# Patient Record
Sex: FEMALE | Race: BLACK OR AFRICAN AMERICAN | NOT HISPANIC OR LATINO | Employment: FULL TIME | URBAN - METROPOLITAN AREA
[De-identification: names, ages, dates, MRNs, and addresses within clinical notes are randomized per-mention and may not be internally consistent; named-entity substitution may affect disease eponyms.]

---

## 2017-01-10 PROBLEM — K50.111 CROHN'S DISEASE OF LARGE INTESTINE WITH RECTAL BLEEDING: Status: ACTIVE | Noted: 2017-01-10

## 2017-01-10 PROBLEM — Z22.7 LATENT TUBERCULOSIS BY SKIN TEST: Status: ACTIVE | Noted: 2017-01-10

## 2019-04-12 ENCOUNTER — TELEPHONE (OUTPATIENT)
Dept: PHARMACY | Facility: CLINIC | Age: 34
End: 2019-04-12

## 2019-04-15 ENCOUNTER — TELEPHONE (OUTPATIENT)
Dept: PHARMACY | Facility: CLINIC | Age: 34
End: 2019-04-15

## 2019-04-16 ENCOUNTER — TELEPHONE (OUTPATIENT)
Dept: PHARMACY | Facility: CLINIC | Age: 34
End: 2019-04-16

## 2019-04-16 NOTE — TELEPHONE ENCOUNTER
DOCUMENTATION ONLY:  Prior authorization for Humira 40mg Pen approved from 04/16/2019 to 04/16/2020  Case ID: [id number]    Co-pay: $0.00    Patient Assistance IS NOT required, sending to clinical pharmacist for  and shipment. Nasir REED

## 2019-04-26 NOTE — TELEPHONE ENCOUNTER
Spoke with patient regarding humira - refill too soon until 5/18. Has been billed at [facility]. Patient says she has been getting there, needed reauthorization so was sent to [facility]. She was not aware new PA approved, but will  from [facility] this month as she needs asap - they open Monday. She would like to proceed with [facility] and mailing of Humira moving forward. Will check back at next payable refill 5/18 so on Monday 5/20. Advised her to let [facility] know if she has any trouble picking up Humira next week.

## 2019-05-27 ENCOUNTER — TELEPHONE (OUTPATIENT)
Dept: PHARMACY | Facility: CLINIC | Age: 34
End: 2019-05-27

## 2019-05-27 NOTE — TELEPHONE ENCOUNTER
Initial Humira 40mg/0.8ml Pen Injection consult completed on . Humira 40mg/0.8ml Pen Injection will be shipped on  to arrive at patient's home on  via [employer]. $ 0 copay. Address confirmed. Confirmed 2 patient identifiers - name and . Therapy Appropriate.    Counseled patient on administration directions:   Inject Humira 40mg (1pen) into the skin every 14 days.- Take out of the refrigerator 30-60 minutes prior to injection.  - Wash hands before and after injection.  - Monthly RX will come with gauze, bandaids, and alcohol swabs.  - Patient may inject in either the tops of the thighs, abdomen- but at least 2 inches away from her belly button, or the outer part of her upper arm.  Patient was instructed to rotate injections sites.  - Patient is to wipe down the injection site with the alcohol pad, wait to dry.  Gently squeeze the area of the cleaned skin and hold it firmly.   Place the pen flat against the raised area of skin that is being squeezed, then push down on the button and hold for 10-15 seconds, until the window has gone from clear to yellow.  - Patient should rotate injection sites.   - Patient will use sharps container; once full, per [place] law, she/ he may lock the sharps container and place in her trash. She/ he can then contact the Pharmacy and we will replace the sharps at no additional charge.    Patient was counseled on possible side effects:  - Injection site reaction: redness, soreness, itching, bruising, which should resolve within 3-5 days.   - Increased risk for infections.  If patient becomes sick, patient is to hold Humira  use until she/ he is better.     DDIs:  Medication list reviewed and potential DDIs addressed.    Patient verbalized understanding. Compliance stressed. Patient advised to keep a calendar marking dates of injections to ensure better compliance. Patient advised to call myself or provider should any questions arise. Patient has been on therapy. Consultation  included: indication; goals of treatment; administration; storage and handling; side effects; how to handle side effects; the importance of compliance; how to handle missed doses; the importance of laboratory monitoring; the importance of keeping all follow up appointments.  Patient understands to report any medication changes to [facility] and provider. All questions answered and addressed to patients satisfaction. [facility] will f/u with her in 1 week from start, [facility] to contact patient in 3 weeks for refills.

## 2019-06-05 PROBLEM — K52.9 IBD (INFLAMMATORY BOWEL DISEASE): Status: ACTIVE | Noted: 2019-06-05

## 2019-06-20 ENCOUNTER — TELEPHONE (OUTPATIENT)
Dept: PHARMACY | Facility: CLINIC | Age: 34
End: 2019-06-20

## 2019-07-02 ENCOUNTER — TELEPHONE (OUTPATIENT)
Dept: PHARMACY | Facility: CLINIC | Age: 34
End: 2019-07-02

## 2019-08-01 ENCOUNTER — TELEPHONE (OUTPATIENT)
Dept: PHARMACY | Facility: CLINIC | Age: 34
End: 2019-08-01

## 2021-04-26 PROBLEM — K50.90 CROHN DISEASE: Status: ACTIVE | Noted: 2021-04-26

## 2021-05-06 ENCOUNTER — NURSE TRIAGE (OUTPATIENT)
Dept: ADMINISTRATIVE | Facility: CLINIC | Age: 36
End: 2021-05-06

## 2021-05-06 ENCOUNTER — PATIENT MESSAGE (OUTPATIENT)
Dept: RESEARCH | Facility: HOSPITAL | Age: 36
End: 2021-05-06

## 2023-04-06 ENCOUNTER — LAB VISIT (OUTPATIENT)
Dept: LAB | Facility: HOSPITAL | Age: 38
End: 2023-04-06
Attending: INTERNAL MEDICINE
Payer: MEDICAID

## 2023-04-06 DIAGNOSIS — K50.118 CROHN'S DISEASE OF LARGE INTESTINE WITH OTHER COMPLICATION: ICD-10-CM

## 2023-04-06 LAB
ALBUMIN SERPL-MCNC: 4.1 G/DL (ref 3.5–5)
ALP SERPL-CCNC: 132 UNIT/L (ref 40–150)
ALT SERPL-CCNC: 22 UNIT/L (ref 0–55)
AST SERPL-CCNC: 27 UNIT/L (ref 5–34)
BILIRUBIN DIRECT+TOT PNL SERPL-MCNC: 0.1 MG/DL (ref 0–?)
BILIRUBIN DIRECT+TOT PNL SERPL-MCNC: 0.2 MG/DL (ref 0–0.8)
BILIRUBIN DIRECT+TOT PNL SERPL-MCNC: 0.3 MG/DL
CRP SERPL HS-MCNC: 4.37 MG/L
HBV SURFACE AG SERPL QL IA: NONREACTIVE
PATH REV: NORMAL
PROT SERPL-MCNC: 8.1 GM/DL (ref 6.4–8.3)

## 2023-04-06 PROCEDURE — 80145 DRUG ASSAY ADALIMUMAB: CPT | Mod: 90

## 2023-04-06 PROCEDURE — 87340 HEPATITIS B SURFACE AG IA: CPT

## 2023-04-06 PROCEDURE — 80076 HEPATIC FUNCTION PANEL: CPT

## 2023-04-06 PROCEDURE — 36415 COLL VENOUS BLD VENIPUNCTURE: CPT

## 2023-04-06 PROCEDURE — 86141 C-REACTIVE PROTEIN HS: CPT

## 2023-04-06 PROCEDURE — 83520 IMMUNOASSAY QUANT NOS NONAB: CPT | Mod: 90

## 2023-04-06 PROCEDURE — 86480 TB TEST CELL IMMUN MEASURE: CPT | Mod: 90

## 2023-04-10 LAB — ADALIMUMAB SERPL IA-MCNC: 0.8 MCG/ML

## 2023-04-11 LAB
ADALIMUMAB AB SERPL-ACNC: <10 AU/ML
GAMMA INTERFERON BACKGROUND BLD IA-ACNC: 0.07 IU/ML
M TB IFN-G BLD-IMP: POSITIVE
M TB IFN-G CD4+ BCKGRND COR BLD-ACNC: 0.57 IU/ML
M TB IFN-G CD4+CD8+ BCKGRND COR BLD-ACNC: 0.73 IU/ML
MITOGEN IGNF BCKGRD COR BLD-ACNC: >10 IU/ML

## 2024-09-27 DIAGNOSIS — K50.111 CROHN'S DISEASE OF LARGE INTESTINE WITH RECTAL BLEEDING: Primary | ICD-10-CM

## 2024-11-12 ENCOUNTER — OFFICE VISIT (OUTPATIENT)
Dept: GASTROENTEROLOGY | Facility: CLINIC | Age: 39
End: 2024-11-12
Payer: MEDICAID

## 2024-11-12 ENCOUNTER — HOSPITAL ENCOUNTER (OUTPATIENT)
Dept: RADIOLOGY | Facility: HOSPITAL | Age: 39
Discharge: HOME | End: 2024-11-12
Attending: INTERNAL MEDICINE
Payer: MEDICAID

## 2024-11-12 VITALS
BODY MASS INDEX: 36.7 KG/M2 | DIASTOLIC BLOOD PRESSURE: 80 MMHG | RESPIRATION RATE: 16 BRPM | TEMPERATURE: 99 F | WEIGHT: 215 LBS | SYSTOLIC BLOOD PRESSURE: 116 MMHG | HEIGHT: 64 IN | HEART RATE: 90 BPM | OXYGEN SATURATION: 99 %

## 2024-11-12 DIAGNOSIS — Z22.7 LATENT TUBERCULOSIS BY BLOOD TEST: ICD-10-CM

## 2024-11-12 DIAGNOSIS — K51.50 LEFT SIDED ULCERATIVE COLITIS WITHOUT COMPLICATION: Primary | ICD-10-CM

## 2024-11-12 PROBLEM — K51.30 ULCERATIVE RECTOSIGMOIDITIS WITHOUT COMPLICATION: Status: ACTIVE | Noted: 2017-01-10

## 2024-11-12 LAB
25(OH)D3+25(OH)D2 SERPL-MCNC: 16 NG/ML (ref 30–80)
ALBUMIN SERPL-MCNC: 3.5 G/DL (ref 3.5–5)
ALBUMIN/GLOB SERPL: 0.8 RATIO (ref 1.1–2)
ALP SERPL-CCNC: 90 UNIT/L (ref 40–150)
ALT SERPL-CCNC: 10 UNIT/L (ref 0–55)
ANION GAP SERPL CALC-SCNC: 7 MEQ/L
AST SERPL-CCNC: 30 UNIT/L (ref 5–34)
BASOPHILS # BLD AUTO: 0.02 X10(3)/MCL
BASOPHILS NFR BLD AUTO: 0.4 %
BILIRUB SERPL-MCNC: 0.2 MG/DL
BUN SERPL-MCNC: 6.4 MG/DL (ref 7–18.7)
CALCIUM SERPL-MCNC: 9.6 MG/DL (ref 8.4–10.2)
CHLORIDE SERPL-SCNC: 107 MMOL/L (ref 98–107)
CO2 SERPL-SCNC: 25 MMOL/L (ref 22–29)
CREAT SERPL-MCNC: 0.7 MG/DL (ref 0.55–1.02)
CREAT/UREA NIT SERPL: 9
CRP SERPL-MCNC: 13.3 MG/L
EOSINOPHIL # BLD AUTO: 0.15 X10(3)/MCL (ref 0–0.9)
EOSINOPHIL NFR BLD AUTO: 3.3 %
ERYTHROCYTE [DISTWIDTH] IN BLOOD BY AUTOMATED COUNT: 14.7 % (ref 11.5–17)
FERRITIN SERPL-MCNC: 58.33 NG/ML (ref 4.63–204)
FOLATE SERPL-MCNC: 14.3 NG/ML (ref 7–31.4)
GFR SERPLBLD CREATININE-BSD FMLA CKD-EPI: >60 ML/MIN/1.73/M2
GLOBULIN SER-MCNC: 4.3 GM/DL (ref 2.4–3.5)
GLUCOSE SERPL-MCNC: 83 MG/DL (ref 74–100)
HAV AB SER QL IA: NONREACTIVE
HBV CORE AB SERPL QL IA: NONREACTIVE
HBV SURFACE AB SER-ACNC: 4622.59 MIU/ML
HBV SURFACE AB SERPL IA-ACNC: REACTIVE M[IU]/ML
HBV SURFACE AG SERPL QL IA: NONREACTIVE
HCT VFR BLD AUTO: 34.3 % (ref 37–47)
HCV AB SERPL QL IA: NONREACTIVE
HGB BLD-MCNC: 11.1 G/DL (ref 12–16)
IMM GRANULOCYTES # BLD AUTO: 0.01 X10(3)/MCL (ref 0–0.04)
IMM GRANULOCYTES NFR BLD AUTO: 0.2 %
IRON SATN MFR SERPL: 17 % (ref 20–50)
IRON SERPL-MCNC: 52 UG/DL (ref 50–170)
LYMPHOCYTES # BLD AUTO: 1.57 X10(3)/MCL (ref 0.6–4.6)
LYMPHOCYTES NFR BLD AUTO: 34.1 %
MCH RBC QN AUTO: 27.7 PG (ref 27–31)
MCHC RBC AUTO-ENTMCNC: 32.4 G/DL (ref 33–36)
MCV RBC AUTO: 85.5 FL (ref 80–94)
MONOCYTES # BLD AUTO: 0.38 X10(3)/MCL (ref 0.1–1.3)
MONOCYTES NFR BLD AUTO: 8.3 %
NEUTROPHILS # BLD AUTO: 2.47 X10(3)/MCL (ref 2.1–9.2)
NEUTROPHILS NFR BLD AUTO: 53.7 %
NRBC BLD AUTO-RTO: 0 %
PLATELET # BLD AUTO: 378 X10(3)/MCL (ref 130–400)
PMV BLD AUTO: 9 FL (ref 7.4–10.4)
POTASSIUM SERPL-SCNC: 3.6 MMOL/L (ref 3.5–5.1)
PROT SERPL-MCNC: 7.8 GM/DL (ref 6.4–8.3)
RBC # BLD AUTO: 4.01 X10(6)/MCL (ref 4.2–5.4)
SODIUM SERPL-SCNC: 139 MMOL/L (ref 136–145)
TIBC SERPL-MCNC: 258 UG/DL (ref 70–310)
TIBC SERPL-MCNC: 310 UG/DL (ref 250–450)
TRANSFERRIN SERPL-MCNC: 286 MG/DL (ref 180–382)
VIT B12 SERPL-MCNC: 434 PG/ML (ref 213–816)
WBC # BLD AUTO: 4.6 X10(3)/MCL (ref 4.5–11.5)

## 2024-11-12 PROCEDURE — 82607 VITAMIN B-12: CPT | Performed by: INTERNAL MEDICINE

## 2024-11-12 PROCEDURE — 90471 IMMUNIZATION ADMIN: CPT | Mod: PBBFAC

## 2024-11-12 PROCEDURE — 86708 HEPATITIS A ANTIBODY: CPT | Performed by: INTERNAL MEDICINE

## 2024-11-12 PROCEDURE — 90656 IIV3 VACC NO PRSV 0.5 ML IM: CPT | Mod: PBBFAC

## 2024-11-12 PROCEDURE — 86803 HEPATITIS C AB TEST: CPT | Performed by: INTERNAL MEDICINE

## 2024-11-12 PROCEDURE — 86706 HEP B SURFACE ANTIBODY: CPT | Performed by: INTERNAL MEDICINE

## 2024-11-12 PROCEDURE — 82746 ASSAY OF FOLIC ACID SERUM: CPT | Performed by: INTERNAL MEDICINE

## 2024-11-12 PROCEDURE — 86787 VARICELLA-ZOSTER ANTIBODY: CPT | Performed by: INTERNAL MEDICINE

## 2024-11-12 PROCEDURE — 84630 ASSAY OF ZINC: CPT | Performed by: INTERNAL MEDICINE

## 2024-11-12 PROCEDURE — 86704 HEP B CORE ANTIBODY TOTAL: CPT | Performed by: INTERNAL MEDICINE

## 2024-11-12 PROCEDURE — 99214 OFFICE O/P EST MOD 30 MIN: CPT | Mod: PBBFAC,25 | Performed by: INTERNAL MEDICINE

## 2024-11-12 PROCEDURE — 36415 COLL VENOUS BLD VENIPUNCTURE: CPT | Performed by: INTERNAL MEDICINE

## 2024-11-12 PROCEDURE — 87340 HEPATITIS B SURFACE AG IA: CPT | Performed by: INTERNAL MEDICINE

## 2024-11-12 PROCEDURE — 71046 X-RAY EXAM CHEST 2 VIEWS: CPT | Mod: TC

## 2024-11-12 PROCEDURE — 82728 ASSAY OF FERRITIN: CPT | Performed by: INTERNAL MEDICINE

## 2024-11-12 PROCEDURE — 86140 C-REACTIVE PROTEIN: CPT | Performed by: INTERNAL MEDICINE

## 2024-11-12 PROCEDURE — 82306 VITAMIN D 25 HYDROXY: CPT | Performed by: INTERNAL MEDICINE

## 2024-11-12 PROCEDURE — 83540 ASSAY OF IRON: CPT | Performed by: INTERNAL MEDICINE

## 2024-11-12 PROCEDURE — 80053 COMPREHEN METABOLIC PANEL: CPT | Performed by: INTERNAL MEDICINE

## 2024-11-12 PROCEDURE — 85025 COMPLETE CBC W/AUTO DIFF WBC: CPT | Performed by: INTERNAL MEDICINE

## 2024-11-12 RX ORDER — POLYETHYLENE GLYCOL 3350, SODIUM SULFATE, SODIUM CHLORIDE, POTASSIUM CHLORIDE, SODIUM ASCORBATE, AND ASCORBIC ACID 7.5-2.691G
KIT ORAL
Qty: 1 KIT | Refills: 0 | Status: SHIPPED | OUTPATIENT
Start: 2024-11-12 | End: 2024-12-03 | Stop reason: HOSPADM

## 2024-11-12 RX ORDER — ERGOCALCIFEROL 1.25 MG/1
50000 CAPSULE ORAL
Qty: 12 CAPSULE | Refills: 1 | Status: SHIPPED | OUTPATIENT
Start: 2024-11-12 | End: 2025-04-23

## 2024-11-12 RX ADMIN — INFLUENZA VIRUS VACCINE 0.5 ML: 15; 15; 15 SUSPENSION INTRAMUSCULAR at 11:11

## 2024-11-12 NOTE — PROGRESS NOTES
Inflammatory Bowel Disease          New Patient Note         TODAY'S VISIT DATE:  11/12/2024    Reason for Consult:    Chief Complaint   Patient presents with    Crohn's Disease     Lost contact with previous GI from [place].  Was taking Stelara q 4wk.  Last dose was July 2024.  Did a detox tea x 2 months ago and noticed she is starting to have more abd cramping and constant urge to have a BM with rectal bleeding.  Total of 7 stools on a bad day. Normal day about 4-5 stools.       [facility]P: [facility]      Referring MD:   Paper Order    History of Present Illness:    Ms. Donnelly is a 39 year old AAF with depression, left sided ulcerative colitis/rectosigmoitis here to establish care.     Initially diagnosed by Dr. Mcneal and told IBD but unsure type.  She was put on Lialda.  She then established with Dr. Swann and underwent colonoscopy in August 2018 (on Lialda) with results as follows    August 9, 2018 Colonoscopy (Dr. Swann): Normal TI. Congested mucosa from rectum to 30 cm from the anal verge.      She was started on Humira 40 mg every other week + azathioprine 50 mg daily  She had colonoscopies in 2019, 2020 that showed inactive disease.     Developed bleeding and diarrhea in 2021 with colonoscopy in April 2021 suggesting active disease.  Humira was increased to 40 mg weekly and azathioprine increased to 100 mg daily.  She stayed on this regimen for over a year and felt improvement on this combination of meds. She did not have a follow-up colonoscopy on this regimen.     She transitioned care to Dr. Poncho Martinez around April 2023.  She had run out of Humira around this time.   April 2023: Ada level 0.8 mcg/mL, Ab: < 10 ([place])       She was started on Stelara in May/June 2023.  She did have active symptoms at that time.  She was given steroids as well.   She was on Stelara up until July 2024.  She was not able to get refilled. She thinks her symptoms improved on Stelara.      Prior to one month ago, she was having 1-3 formed stools a day without bleeding.  For the past month she is having 4-7 loose stools a day.  She sees bright red blood with some stools.  She has some urgency and tenesmus.   She has some mucous and blood output alone at times.  She reports abdominal cramping prior to stools.  Appetite good.  Weight is stable.  She has some knee soreness at night and sometimes in the morning.  She denies any rashes or vision difficulties.      She does not smoke or use alcohol.  She denies any recreational drugs.  Maternal grandfather with colon cancer in his 70s.  Cousin with crohns.    She was diagnosed and latent TB. She completed INH / Rifapentine in 2017 through [facility]. CXR was negative at that time.  After a positive quantiferon in 2023, her Humira was held.         IBD History:   IBD disease: Ulcerative rectosigmoiditis/ left sided ulcerative colitis   Disease location: rectum, sigmoid, descending      Current IBD Therapy:  none    Therapeutic Drug Monitoring Labs:      Prior IBD Therapies:  Lialda/mesalamine  Humira  Azathioprine  Stelara      Pertinent Endoscopy/Imaging:    August 9, 2018 Colonoscopy (Dr. Swann): Normal TI. Congested mucosa from rectum to 30 cm from the anal verge.      June 5, 2019: Colonoscopy: (Dr. Swann): minimal erythema in the rectum.  O/w normal colon.  Normal TI    April 13, 2020: Colonoscopy: (Dr. Swann): Erythema and congestion with mild loss of vascularity in the anus to distal descending colon.  O/w normal.     April 26, 2021: Colonoscopy (Dr. Swann): active disease (congestion, loss of vascularity from rectum to distal descending colon).  O/w normal.        Prior Pertinent Surgeries:   none    Complications:   none    Extraintestinal Manifestations:  none      Review of Systems   Constitutional:  Negative for appetite change and unexpected weight change.   HENT:  Negative for trouble swallowing.    Respiratory:   Negative for chest tightness.    Cardiovascular: Negative.    Gastrointestinal:  Positive for blood in stool and diarrhea. Negative for abdominal pain and change in bowel habit.   Musculoskeletal: Negative.    Neurological: Negative.    Psychiatric/Behavioral: Negative.     All other systems reviewed and are negative.      Medical/Surgical History:   Past Medical History:   Diagnosis Date    Depression     Latent tuberculosis by blood test, s/p prophalaxis     Ulcerative rectosigmoiditis without complication      Past Surgical History:   Procedure Laterality Date    COLONOSCOPY N/A 8/9/2018    Procedure: COLONOSCOPY;  Surgeon: Eron Swann MD;  Location: [facility];  Service: Endoscopy;  Laterality: N/A;    COLONOSCOPY N/A 6/5/2019    Procedure: COLONOSCOPY;  Surgeon: Eron Swann MD;  Location: [facility];  Service: Endoscopy;  Laterality: N/A;    COLONOSCOPY N/A 4/13/2020    Procedure: COLONOSCOPY;  Surgeon: Eron Swann MD;  Location: [facility];  Service: Endoscopy;  Laterality: N/A;    COLONOSCOPY N/A 4/26/2021    Procedure: COLONOSCOPY;  Surgeon: Eron Swann MD;  Location: [facility];  Service: Endoscopy;  Laterality: N/A;  ok per Robyn to over book    COLONOSCOPY W/ BIOPSIES      ESOPHAGOGASTRODUODENOSCOPY      UPPER GASTROINTESTINAL ENDOSCOPY           Family History:   Family History   Problem Relation Name Age of Onset    Pneumonia Mother      No Known Problems Father      Colon cancer Maternal Grandfather      Ovarian cancer Paternal Grandmother      Crohn's disease Other          Social History:   Social History     Socioeconomic History    Marital status:    Tobacco Use    Smoking status: Never    Smokeless tobacco: Never   Substance and Sexual Activity    Alcohol use: No    Drug use: No    Sexual activity: Never     Social Drivers of Health     Financial Resource Strain: Medium Risk (12/22/2022)    Received from [facility]  "System and Its Subsidiaries and Affiliates    Overall Financial Resource Strain (CARDIA)     Difficulty of Paying Living Expenses: Somewhat hard   Stress: No Stress Concern Present (12/22/2022)    Received from [facility] and Its Subsidiaries and Affiliates    [employer] [place] [employer] - Occupational Stress Questionnaire     Feeling of Stress : Not at all   Housing Stability: Unknown (12/22/2022)    [facility]    Housing Stability Vital Sign     Unable to Pay for Housing in the Last Year: No     Number of Places Lived in the Last Year: 2        Review of patient's allergies indicates:  No Known Allergies    Current Medications:   No outpatient medications have been marked as taking for the 11/12/24 encounter (Office Visit) with Luzmaria Garcia MD.        Vital Signs:  /80 (BP Location: Left arm, Patient Position: Sitting)   Pulse 90   Temp 98.6 °F (37 °C) (Oral)   Resp 16   Ht 5' 4" (1.626 m)   Wt 97.5 kg (215 lb)   SpO2 99%   BMI 36.90 kg/m²      Physical Exam  Constitutional:       Appearance: Normal appearance.   HENT:      Head: Normocephalic and atraumatic.      Mouth/Throat:      Mouth: Mucous membranes are moist.   Eyes:      Conjunctiva/sclera: Conjunctivae normal.   Cardiovascular:      Rate and Rhythm: Normal rate and regular rhythm.   Pulmonary:      Effort: Pulmonary effort is normal.      Breath sounds: Normal breath sounds.   Abdominal:      General: Bowel sounds are normal.      Palpations: Abdomen is soft.   Musculoskeletal:         General: Normal range of motion.      Cervical back: Normal range of motion.   Skin:     General: Skin is warm.   Neurological:      General: No focal deficit present.      Mental Status: She is alert and oriented to person, place, and time. Mental status is at baseline.   Psychiatric:         Mood and Affect: " Mood normal.         Behavior: Behavior normal.         Labs: Reviewed  Hemoglobin   Date Value Ref Range Status   12/24/2022 7.3 (L) 12.0 - 16.0 g/dL Final   04/22/2022 11.8 (L) 12.0 - 16.0 g/dL Final     Albumin   Date Value Ref Range Status   04/06/2023 4.1 3.5 - 5.0 g/dL Final   04/22/2022 4.0 3.5 - 5.2 g/dL Final     Hepatitis B Surface Ag   Date Value Ref Range Status   04/22/2022 Negative Negative Final     Hep BsAg Interp   Date Value Ref Range Status   04/06/2023 Nonreactive Nonreactive Final     TB Gold Plus   Date Value Ref Range Status   04/26/2021 Positive (A)  Final     Comment:     The Nil tube value is used to determine if the patient has a  preexisting immune response which could cause a false-positive  reading on the test.  In order for a test to be valid, the   NIL tube must have a value of less than or equal to 8.0 IU/mL.    The mitogen control tube is used to assure the patient has a   healthy immune status and also serves as a control for correct  blood handling and incubation.  It is used to detect false-  negative readings.  The mitogen tube must have a gamma   interferon value of greater than or equal to 0.5 IU/mL higher  that the value of the Nil tube.    The TB antigen tubes are coated with the M. tuberculosis   specific antigens. For a test to be considered positive,the  TB antigen tube values minus the Nil tube value must be   greater than or equal to 0.35 IU/mL.    Diagnosing or excluding tuberculosis disease, and assessing  the probability of LTNI, requires a combination of   epidemiological, historical, medical, and diagnostic findings  that should be taken into account when interpreting   Quantiferon-TB Gold Plus results.          Quantiferon gold:   Fecal calprotectin:       Assessment/Plan:      Problem List Items Addressed This Visit       Latent tuberculosis by blood test     Treated with INH/Rifapentine in 2017.  No pulmonary symptoms  Yearly CXR for monitoring         Relevant  Orders    X-Ray Chest PA And Lateral (Completed)    Left sided ulcerative colitis without complication - Primary     Diagnosed around 2018  Primarily rectosigmoid/left sided disease  Briefly on mesalamine - unknown efficacy  Clinical and endoscopic response to Humira 40 mg every other week + azathioprine 50 mg.  Unknown drug levels  Humira optimized in 2021 when developed active disease - clinical response.  No drug levels or endoscopy.  Treatment switched to Stelara in May/June 2023.  Reported clinical response - no endoscopy.    Off therapy x 4 months  Active symptoms x 1 month  No significant EIM  Labs today  Fecal calprotectin  Schedule colonoscopy.   Will discuss initiating therapy based on endoscopic findings         Relevant Orders    CBC Auto Differential (Completed)    Comprehensive Metabolic Panel (Completed)    C-Reactive Protein (Completed)    Calprotectin, Stool    Iron and TIBC (Completed)    Ferritin (Completed)    Folate (Completed)    Vitamin B12 (Completed)    Vitamin D (Completed)    Zinc (Completed)    Hepatitis B Core Antibody, Total (Completed)    Hepatitis B Surface Antigen (Completed)    Hepatitis C Antibody (Completed)    Hepatitis A antibody, IgG (Completed)    Hepatitis B Surface Ab, Qualitative (Completed)    Varicella Zoster Antibody, IgG (Completed)    Case Request Endoscopy: COLONOSCOPY (Completed)               HEALTH MAINTENANCE:     Vaccinations:    Influenza (inactive):nov 2024  PCV 20 (Prevnar): recommended  Tetanus (TdaP): 2001, recommended every 10 years  HPV (males and females ages 11-44 yo): N/A  Meningococcal: UTD, No risk factors   Hepatitis B: immune  Hepatitis A: not immune      MMR (live vaccine): UTD          Chickenpox status/Varicella (live vaccine):  vaccinated    Shingrix: recommended   COVID-19: recommend      Colorectal cancer risk:  Risk factors: >  8 years of disease, > 1/3 colon involved    - Distribution of colonic disease: < 1/3 of colon     - Year of symptom  onset: 2018    Ophthalmologic exam recommended yearly  Dermatologic exam recommended yearly due to risk of skin cancer with IBD/meds    Bone health:   Calcium 9729-5325 mg daily and vitamin D 800 IU daily   Risk factors for osteopenia/osteoporosis: none   Vitamin D:    Ergocalciferol 50,000 IU weekly x 12 weeks if low     DEXA scan: Recommend baseline    Smoking status: nonsmoker    Follow up: following endoscopy

## 2024-11-13 LAB
AR ZINC, SERUM/PLASMA: 66.5 UG/DL
VZV IGG SER IA-ACNC: 3.6
VZV IGG SER QL IA: POSITIVE

## 2024-11-29 ENCOUNTER — ANESTHESIA EVENT (OUTPATIENT)
Dept: ENDOSCOPY | Facility: HOSPITAL | Age: 39
End: 2024-11-29
Payer: MEDICAID

## 2024-11-29 RX ORDER — LIDOCAINE HYDROCHLORIDE 10 MG/ML
1 INJECTION, SOLUTION EPIDURAL; INFILTRATION; INTRACAUDAL; PERINEURAL ONCE
OUTPATIENT
Start: 2024-11-29 | End: 2024-11-29

## 2024-11-29 RX ORDER — SODIUM CHLORIDE, SODIUM LACTATE, POTASSIUM CHLORIDE, CALCIUM CHLORIDE 600; 310; 30; 20 MG/100ML; MG/100ML; MG/100ML; MG/100ML
INJECTION, SOLUTION INTRAVENOUS CONTINUOUS
OUTPATIENT
Start: 2024-11-29

## 2024-12-02 ENCOUNTER — ANESTHESIA (OUTPATIENT)
Dept: ENDOSCOPY | Facility: HOSPITAL | Age: 39
End: 2024-12-02
Payer: MEDICAID

## 2024-12-02 ENCOUNTER — HOSPITAL ENCOUNTER (OUTPATIENT)
Facility: HOSPITAL | Age: 39
Discharge: HOME | End: 2024-12-02
Attending: INTERNAL MEDICINE | Admitting: INTERNAL MEDICINE
Payer: MEDICAID

## 2024-12-02 DIAGNOSIS — K51.50 LEFT SIDED ULCERATIVE COLITIS WITHOUT COMPLICATION: ICD-10-CM

## 2024-12-02 LAB
B-HCG UR QL: NEGATIVE
CTP QC/QA: YES

## 2024-12-02 PROCEDURE — 81025 URINE PREGNANCY TEST: CPT | Performed by: ANESTHESIOLOGY

## 2024-12-02 PROCEDURE — 45380 COLONOSCOPY AND BIOPSY: CPT | Performed by: INTERNAL MEDICINE

## 2024-12-02 PROCEDURE — 37000009 HC ANESTHESIA EA ADD 15 MINS: Performed by: INTERNAL MEDICINE

## 2024-12-02 PROCEDURE — 88305 TISSUE EXAM BY PATHOLOGIST: CPT | Mod: TC | Performed by: INTERNAL MEDICINE

## 2024-12-02 PROCEDURE — 63600175 PHARM REV CODE 636 W HCPCS: Performed by: NURSE ANESTHETIST, CERTIFIED REGISTERED

## 2024-12-02 PROCEDURE — 37000008 HC ANESTHESIA 1ST 15 MINUTES: Performed by: INTERNAL MEDICINE

## 2024-12-02 PROCEDURE — 27201423 OPTIME MED/SURG SUP & DEVICES STERILE SUPPLY: Performed by: INTERNAL MEDICINE

## 2024-12-02 RX ORDER — LIDOCAINE HYDROCHLORIDE 20 MG/ML
INJECTION INTRAVENOUS
Status: DISCONTINUED | OUTPATIENT
Start: 2024-12-02 | End: 2024-12-02

## 2024-12-02 RX ORDER — PROPOFOL 10 MG/ML
INJECTION, EMULSION INTRAVENOUS
Status: DISCONTINUED | OUTPATIENT
Start: 2024-12-02 | End: 2024-12-02

## 2024-12-02 RX ADMIN — PROPOFOL 120 MCG/KG/MIN: 10 INJECTION, EMULSION INTRAVENOUS at 01:12

## 2024-12-02 RX ADMIN — LIDOCAINE HYDROCHLORIDE 50 MG: 20 INJECTION INTRAVENOUS at 01:12

## 2024-12-02 NOTE — ANESTHESIA POSTPROCEDURE EVALUATION
Anesthesia Post Evaluation    Patient: April Diogenes Donnelly    Procedure(s) Performed: Procedure(s) (LRB):  COLONOSCOPY (N/A)    Final Anesthesia Type: general      Patient location during evaluation: GI PACU  Patient participation: Yes- Able to Participate  Level of consciousness: awake and alert  Post-procedure vital signs: reviewed and stable  Pain management: adequate  Airway patency: patent    PONV status at discharge: No PONV  Anesthetic complications: no      Cardiovascular status: blood pressure returned to baseline  Respiratory status: unassisted  Hydration status: euvolemic  Follow-up not needed.              Vitals Value Taken Time   /85 12/02/24 1203   Temp 37 °C (98.6 °F) 12/02/24 1203   Pulse 108 12/02/24 1204   Resp 18 12/02/24 1203   SpO2 99 % 12/02/24 1203         No case tracking events are documented in the log.      Pain/Shanti Score: No data recorded

## 2024-12-02 NOTE — PROVATION PATIENT INSTRUCTIONS
Discharge Summary/Instructions after an Endoscopic Procedure  Patient Name: Rosie Donnelly  Patient MRN: [id number]  Patient YOB: 1985 Monday, December 2, 2024  Luzmaria Garcia MD  Dear patient,  As a result of recent federal legislation (The Federal Cures Act), you may   receive lab or pathology results from your procedure in your [facility]   account before your physician is able to contact you. Your physician or   their representative will relay the results to you with their   recommendations at their soonest availability.  Thank you,  RESTRICTIONS:  During your procedure today, you received medications for sedation.  These   medications may affect your judgment, balance and coordination.  Therefore,   for 24 hours, you have the following restrictions:   - DO NOT drive a car, operate machinery, make legal/financial decisions,   sign important papers or drink alcohol.    ACTIVITY:  Today: no heavy lifting, straining or running due to procedural   sedation/anesthesia.  The following day: return to full activity including work.  DIET:  Eat and drink normally unless instructed otherwise.     TREATMENT FOR COMMON SIDE EFFECTS:  - Mild abdominal pain, nausea, belching, bloating or excessive gas:  rest,   eat lightly and use a heating pad.  - Sore Throat: treat with throat lozenges and/or gargle with warm salt   water.  - Because air was used during the procedure, expelling large amounts of air   from your rectum or belching is normal.  - If a bowel prep was taken, you may not have a bowel movement for 1-3 days.    This is normal.  SYMPTOMS TO WATCH FOR AND REPORT TO YOUR PHYSICIAN:  1. Abdominal pain or bloating, other than gas cramps.  2. Chest pain.  3. Back pain.  4. Signs of infection such as: chills or fever occurring within 24 hours   after the procedure.  5. Rectal bleeding, which would show as bright red, maroon, or black stools.   (A tablespoon of blood from the rectum is not serious, especially  if   hemorrhoids are present.)  6. Vomiting.  7. Weakness or dizziness.  GO DIRECTLY TO THE NEAREST EMERGENCY ROOM IF YOU HAVE ANY OF THE FOLLOWING:      Difficulty breathing              Chills and/or fever over 101 F   Persistent vomiting and/or vomiting blood   Severe abdominal pain   Severe chest pain   Black, tarry stools   Bleeding- more than one tablespoon   Any other symptom or condition that you feel may need urgent attention  Your doctor recommends these additional instructions:  If any biopsies were taken, your doctors clinic will contact you in 1 to 2   weeks with any results.  - Patient has a contact number available for emergencies.  The signs and   symptoms of potential delayed complications were discussed with the   patient.  Return to normal activities tomorrow.  Written discharge   instructions were provided to the patient.   - Discharge patient to home.   - Resume previous diet.   - Continue present medications.   - Await pathology results.   - Will discuss initiating therapy for treatment  - Return to my office in 3 months.   - No recommendation at this time regarding repeat colonoscopy.  For questions, problems or results please call your physician - Luzmaria Garcia MD at Work:  [phone], Work:  [phone].  [facility] , EMERGENCY ROOM PHONE NUMBER: [phone]  IF A COMPLICATION OR EMERGENCY SITUATION ARISES AND YOU ARE UNABLE TO REACH   YOUR PHYSICIAN - GO DIRECTLY TO THE EMERGENCY ROOM.  Luzmaria Garcia MD  12/2/2024 2:32:59 PM  This report has been verified and signed electronically.  Dear patient,  As a result of recent federal legislation (The Federal Cures Act), you may   receive lab or pathology results from your procedure in your [facility]   account before your physician is able to contact you. Your physician or   their representative will relay the results to you with their   recommendations at their soonest availability.  Thank  you,  PROVATION

## 2024-12-02 NOTE — PLAN OF CARE
Pt back to baseline. A&O X4. Follows commands. Tolerating oral fluids.performs self care and dressed self. Pt and family educated on post op care. Discharge instructions handout given to patient. All questions and concerns were addressed. Dr. Garcia at the bedside

## 2024-12-02 NOTE — TRANSFER OF CARE
"Anesthesia Transfer of Care Note    Patient: April Diogenes Donnelly    Procedure(s) Performed: Procedure(s) (LRB):  COLONOSCOPY (N/A)    Patient location: GI    Anesthesia Type: general    Transport from OR: Transported from OR on room air with adequate spontaneous ventilation    Post pain: adequate analgesia    Post assessment: no apparent anesthetic complications    Post vital signs: stable    Level of consciousness: awake    Nausea/Vomiting: no nausea/vomiting    Complications: none    Transfer of care protocol was followed      Last vitals: Visit Vitals  /85 (BP Location: Left arm)   Pulse 108   Temp 37 °C (98.6 °F) (Oral)   Resp 18   Ht 5' 4" (1.626 m)   Wt 97.3 kg (214 lb 9.6 oz)   LMP 10/22/2024 (Exact Date)   SpO2 99%   Breastfeeding Yes   BMI 36.84 kg/m²     "

## 2024-12-02 NOTE — H&P
[facility]  Pre-OP H&P    SUBJECTIVE:  Rosie Donnelly is a 39 y.o. y/o female w/ PMHx of Crohn's who presents today for scheduled Colonoscopy. Patient having 3-7 BM a day with last reported BRBPR this morning.     Patient's Last colonoscopy was 4/26/21 which showed :   Findings:       Active disease (congestion, loss of vascularity from the rectum to        the distal descending colon)        The exam was otherwise without abnormality.   Impression:            - Active UC seen today (MES 1+)     OBJECTIVE:  There were no vitals filed for this visit.    General: female in NAD. Not toxic appearing.   HENT: EOM intact.   Pulmonary: No respiratory distress. Effort normal.  Cardiovascular: Regular rate.   Abdomen: soft, non-tender, non-distended      PATHOLOGY:         ASSESSMENT/PLAN:    Rosie Donnelly is a 39 y.o. y/o female who presents today for above stated scheduled procedure     -All questions answered   -Patient consented for Colonoscopy         Wilver Aranda MD   PGY-2   [employer] General Surgery

## 2024-12-03 VITALS
WEIGHT: 214.63 LBS | SYSTOLIC BLOOD PRESSURE: 112 MMHG | HEIGHT: 64 IN | RESPIRATION RATE: 17 BRPM | DIASTOLIC BLOOD PRESSURE: 80 MMHG | OXYGEN SATURATION: 100 % | HEART RATE: 59 BPM | TEMPERATURE: 99 F | BODY MASS INDEX: 36.64 KG/M2

## 2024-12-03 NOTE — ASSESSMENT & PLAN NOTE
Diagnosed around 2018  Primarily rectosigmoid/left sided disease  Briefly on mesalamine - unknown efficacy  Clinical and endoscopic response to Humira 40 mg every other week + azathioprine 50 mg.  Unknown drug levels  Humira optimized in 2021 when developed active disease - clinical response.  No drug levels or endoscopy.  Treatment switched to Stelara in May/June 2023.  Reported clinical response - no endoscopy.    Off therapy x 4 months  Active symptoms x 1 month  No significant EIM  Labs today  Fecal calprotectin  Schedule colonoscopy.   Will discuss initiating therapy based on endoscopic findings

## 2024-12-04 ENCOUNTER — TELEPHONE (OUTPATIENT)
Dept: GASTROENTEROLOGY | Facility: CLINIC | Age: 39
End: 2024-12-04
Payer: MEDICAID

## 2024-12-04 LAB
DHEA SERPL-MCNC: NORMAL
ESTROGEN SERPL-MCNC: NORMAL PG/ML
INSULIN SERPL-ACNC: NORMAL U[IU]/ML
LAB AP CLINICAL INFORMATION: NORMAL
LAB AP GROSS DESCRIPTION: NORMAL
LAB AP REPORT FOOTNOTES: NORMAL
T3RU NFR SERPL: NORMAL %

## 2024-12-04 RX ORDER — METHYLPREDNISOLONE SOD SUCC 125 MG
40 VIAL (EA) INJECTION
OUTPATIENT
Start: 2024-12-04

## 2024-12-04 RX ORDER — SODIUM CHLORIDE 0.9 % (FLUSH) 0.9 %
10 SYRINGE (ML) INJECTION
OUTPATIENT
Start: 2024-12-04

## 2024-12-04 RX ORDER — IPRATROPIUM BROMIDE AND ALBUTEROL SULFATE 2.5; .5 MG/3ML; MG/3ML
3 SOLUTION RESPIRATORY (INHALATION)
OUTPATIENT
Start: 2024-12-04

## 2024-12-04 RX ORDER — DIPHENHYDRAMINE HYDROCHLORIDE 50 MG/ML
25 INJECTION INTRAMUSCULAR; INTRAVENOUS
OUTPATIENT
Start: 2024-12-04

## 2024-12-04 RX ORDER — HEPARIN 100 UNIT/ML
500 SYRINGE INTRAVENOUS
OUTPATIENT
Start: 2024-12-04

## 2024-12-04 RX ORDER — ACETAMINOPHEN 325 MG/1
650 TABLET ORAL
OUTPATIENT
Start: 2024-12-04

## 2024-12-04 RX ORDER — EPINEPHRINE 1 MG/ML
0.3 INJECTION, SOLUTION, CONCENTRATE INTRAVENOUS
OUTPATIENT
Start: 2024-12-04

## 2024-12-04 NOTE — TELEPHONE ENCOUNTER
----- Message from Luzmaria Garcia MD sent at 12/3/2024  9:54 AM CST -----  Starting entyvio for patient treatment.

## 2024-12-30 ENCOUNTER — INFUSION (OUTPATIENT)
Dept: INFUSION THERAPY | Facility: HOSPITAL | Age: 39
End: 2024-12-30
Attending: INTERNAL MEDICINE
Payer: MEDICAID

## 2024-12-30 VITALS
DIASTOLIC BLOOD PRESSURE: 77 MMHG | HEART RATE: 97 BPM | OXYGEN SATURATION: 97 % | TEMPERATURE: 98 F | BODY MASS INDEX: 35.74 KG/M2 | WEIGHT: 209.38 LBS | HEIGHT: 64 IN | SYSTOLIC BLOOD PRESSURE: 117 MMHG | RESPIRATION RATE: 20 BRPM

## 2024-12-30 DIAGNOSIS — K51.50 LEFT SIDED ULCERATIVE COLITIS WITHOUT COMPLICATION: Primary | ICD-10-CM

## 2024-12-30 PROCEDURE — 96365 THER/PROPH/DIAG IV INF INIT: CPT

## 2024-12-30 PROCEDURE — 63600175 PHARM REV CODE 636 W HCPCS: Mod: JZ,JG | Performed by: INTERNAL MEDICINE

## 2024-12-30 PROCEDURE — 25000003 PHARM REV CODE 250: Performed by: INTERNAL MEDICINE

## 2024-12-30 RX ORDER — SODIUM CHLORIDE 0.9 % (FLUSH) 0.9 %
10 SYRINGE (ML) INJECTION
OUTPATIENT
Start: 2025-02-18

## 2024-12-30 RX ORDER — HEPARIN 100 UNIT/ML
500 SYRINGE INTRAVENOUS
Status: DISCONTINUED | OUTPATIENT
Start: 2024-12-30 | End: 2024-12-30 | Stop reason: HOSPADM

## 2024-12-30 RX ORDER — EPINEPHRINE 1 MG/ML
0.3 INJECTION, SOLUTION, CONCENTRATE INTRAVENOUS
OUTPATIENT
Start: 2025-02-18

## 2024-12-30 RX ORDER — IPRATROPIUM BROMIDE AND ALBUTEROL SULFATE 2.5; .5 MG/3ML; MG/3ML
3 SOLUTION RESPIRATORY (INHALATION)
OUTPATIENT
Start: 2025-02-18

## 2024-12-30 RX ORDER — DIPHENHYDRAMINE HYDROCHLORIDE 50 MG/ML
25 INJECTION INTRAMUSCULAR; INTRAVENOUS
Status: DISCONTINUED | OUTPATIENT
Start: 2024-12-30 | End: 2024-12-30 | Stop reason: HOSPADM

## 2024-12-30 RX ORDER — ACETAMINOPHEN 325 MG/1
650 TABLET ORAL
OUTPATIENT
Start: 2025-02-18

## 2024-12-30 RX ORDER — IPRATROPIUM BROMIDE AND ALBUTEROL SULFATE 2.5; .5 MG/3ML; MG/3ML
3 SOLUTION RESPIRATORY (INHALATION)
Status: ACTIVE | OUTPATIENT
Start: 2024-12-30 | End: 2024-12-30

## 2024-12-30 RX ORDER — ACETAMINOPHEN 325 MG/1
650 TABLET ORAL
Status: ACTIVE | OUTPATIENT
Start: 2024-12-30 | End: 2024-12-30

## 2024-12-30 RX ORDER — EPINEPHRINE 1 MG/ML
0.3 INJECTION INTRAMUSCULAR; INTRAVENOUS; SUBCUTANEOUS
Status: ACTIVE | OUTPATIENT
Start: 2024-12-30 | End: 2024-12-30

## 2024-12-30 RX ORDER — HEPARIN 100 UNIT/ML
500 SYRINGE INTRAVENOUS
OUTPATIENT
Start: 2025-02-18

## 2024-12-30 RX ORDER — METHYLPREDNISOLONE SOD SUCC 125 MG
40 VIAL (EA) INJECTION
OUTPATIENT
Start: 2025-02-18

## 2024-12-30 RX ORDER — SODIUM CHLORIDE 0.9 % (FLUSH) 0.9 %
10 SYRINGE (ML) INJECTION
Status: DISCONTINUED | OUTPATIENT
Start: 2024-12-30 | End: 2024-12-30 | Stop reason: HOSPADM

## 2024-12-30 RX ORDER — DIPHENHYDRAMINE HYDROCHLORIDE 50 MG/ML
25 INJECTION INTRAMUSCULAR; INTRAVENOUS
Status: CANCELLED | OUTPATIENT
Start: 2025-02-18

## 2024-12-30 RX ADMIN — VEDOLIZUMAB 300 MG: 300 INJECTION, POWDER, LYOPHILIZED, FOR SOLUTION INTRAVENOUS at 01:12

## 2024-12-30 RX ADMIN — SODIUM CHLORIDE: 9 INJECTION, SOLUTION INTRAVENOUS at 01:12

## 2024-12-30 NOTE — NURSING
Pt presented to [facility]fusion Clinic for Week 0 Entyvio for Ulcerative Coliti, chief complaint if diarrhea which is bloody at times; pt is breastfeeding her 2 year infant; Dr Garcia gave ok give Entyvio while breastfeeding; pt given Kirstin-drugs pt education on Entyvio; tolerated without incident.

## 2025-01-15 ENCOUNTER — INFUSION (OUTPATIENT)
Dept: INFUSION THERAPY | Facility: HOSPITAL | Age: 40
End: 2025-01-15
Payer: MEDICAID

## 2025-01-15 VITALS
OXYGEN SATURATION: 99 % | DIASTOLIC BLOOD PRESSURE: 66 MMHG | BODY MASS INDEX: 36.13 KG/M2 | RESPIRATION RATE: 20 BRPM | SYSTOLIC BLOOD PRESSURE: 120 MMHG | HEIGHT: 64 IN | TEMPERATURE: 98 F | HEART RATE: 79 BPM | WEIGHT: 211.63 LBS

## 2025-01-15 DIAGNOSIS — K51.50 LEFT SIDED ULCERATIVE COLITIS WITHOUT COMPLICATION: Primary | ICD-10-CM

## 2025-01-15 PROCEDURE — 96365 THER/PROPH/DIAG IV INF INIT: CPT

## 2025-01-15 PROCEDURE — 63600175 PHARM REV CODE 636 W HCPCS: Mod: JZ,TB | Performed by: INTERNAL MEDICINE

## 2025-01-15 PROCEDURE — 25000003 PHARM REV CODE 250: Performed by: INTERNAL MEDICINE

## 2025-01-15 RX ORDER — EPINEPHRINE 1 MG/ML
0.3 INJECTION INTRAMUSCULAR; INTRAVENOUS; SUBCUTANEOUS
Status: ACTIVE | OUTPATIENT
Start: 2025-01-15 | End: 2025-01-15

## 2025-01-15 RX ORDER — SODIUM CHLORIDE 0.9 % (FLUSH) 0.9 %
10 SYRINGE (ML) INJECTION
Status: DISCONTINUED | OUTPATIENT
Start: 2025-01-15 | End: 2025-01-15 | Stop reason: HOSPADM

## 2025-01-15 RX ORDER — SODIUM CHLORIDE 0.9 % (FLUSH) 0.9 %
10 SYRINGE (ML) INJECTION
OUTPATIENT
Start: 2025-02-18

## 2025-01-15 RX ORDER — IPRATROPIUM BROMIDE AND ALBUTEROL SULFATE 2.5; .5 MG/3ML; MG/3ML
3 SOLUTION RESPIRATORY (INHALATION)
Status: ACTIVE | OUTPATIENT
Start: 2025-01-15 | End: 2025-01-15

## 2025-01-15 RX ORDER — IPRATROPIUM BROMIDE AND ALBUTEROL SULFATE 2.5; .5 MG/3ML; MG/3ML
3 SOLUTION RESPIRATORY (INHALATION)
OUTPATIENT
Start: 2025-02-18

## 2025-01-15 RX ORDER — ACETAMINOPHEN 325 MG/1
650 TABLET ORAL
Status: ACTIVE | OUTPATIENT
Start: 2025-01-15 | End: 2025-01-15

## 2025-01-15 RX ORDER — HEPARIN 100 UNIT/ML
500 SYRINGE INTRAVENOUS
OUTPATIENT
Start: 2025-02-18

## 2025-01-15 RX ORDER — ACETAMINOPHEN 325 MG/1
650 TABLET ORAL
OUTPATIENT
Start: 2025-02-18

## 2025-01-15 RX ORDER — HEPARIN 100 UNIT/ML
500 SYRINGE INTRAVENOUS
Status: DISCONTINUED | OUTPATIENT
Start: 2025-01-15 | End: 2025-01-15 | Stop reason: HOSPADM

## 2025-01-15 RX ORDER — EPINEPHRINE 1 MG/ML
0.3 INJECTION, SOLUTION, CONCENTRATE INTRAVENOUS
OUTPATIENT
Start: 2025-02-18

## 2025-01-15 RX ORDER — METHYLPREDNISOLONE SOD SUCC 125 MG
40 VIAL (EA) INJECTION
OUTPATIENT
Start: 2025-02-18

## 2025-01-15 RX ADMIN — VEDOLIZUMAB 300 MG: 300 INJECTION, POWDER, LYOPHILIZED, FOR SOLUTION INTRAVENOUS at 02:01

## 2025-02-12 ENCOUNTER — INFUSION (OUTPATIENT)
Dept: INFUSION THERAPY | Facility: HOSPITAL | Age: 40
End: 2025-02-12
Payer: MEDICAID

## 2025-02-12 VITALS
HEIGHT: 64 IN | SYSTOLIC BLOOD PRESSURE: 119 MMHG | BODY MASS INDEX: 36.05 KG/M2 | HEART RATE: 82 BPM | OXYGEN SATURATION: 99 % | WEIGHT: 211.19 LBS | DIASTOLIC BLOOD PRESSURE: 86 MMHG | RESPIRATION RATE: 20 BRPM | TEMPERATURE: 98 F

## 2025-02-12 DIAGNOSIS — K51.50 LEFT SIDED ULCERATIVE COLITIS WITHOUT COMPLICATION: Primary | ICD-10-CM

## 2025-02-12 PROCEDURE — 96365 THER/PROPH/DIAG IV INF INIT: CPT

## 2025-02-12 PROCEDURE — 63600175 PHARM REV CODE 636 W HCPCS: Mod: JZ,TB | Performed by: INTERNAL MEDICINE

## 2025-02-12 PROCEDURE — 25000003 PHARM REV CODE 250: Performed by: INTERNAL MEDICINE

## 2025-02-12 PROCEDURE — A4216 STERILE WATER/SALINE, 10 ML: HCPCS | Performed by: INTERNAL MEDICINE

## 2025-02-12 RX ORDER — ACETAMINOPHEN 325 MG/1
650 TABLET ORAL
Status: ACTIVE | OUTPATIENT
Start: 2025-02-12 | End: 2025-02-12

## 2025-02-12 RX ORDER — METHYLPREDNISOLONE SOD SUCC 125 MG
40 VIAL (EA) INJECTION
OUTPATIENT
Start: 2025-03-12

## 2025-02-12 RX ORDER — EPINEPHRINE 1 MG/ML
0.3 INJECTION INTRAMUSCULAR; INTRAVENOUS; SUBCUTANEOUS
Status: ACTIVE | OUTPATIENT
Start: 2025-02-12 | End: 2025-02-12

## 2025-02-12 RX ORDER — SODIUM CHLORIDE 0.9 % (FLUSH) 0.9 %
10 SYRINGE (ML) INJECTION
Status: DISCONTINUED | OUTPATIENT
Start: 2025-02-12 | End: 2025-02-12 | Stop reason: HOSPADM

## 2025-02-12 RX ORDER — IPRATROPIUM BROMIDE AND ALBUTEROL SULFATE 2.5; .5 MG/3ML; MG/3ML
3 SOLUTION RESPIRATORY (INHALATION)
OUTPATIENT
Start: 2025-03-12

## 2025-02-12 RX ORDER — ACETAMINOPHEN 325 MG/1
650 TABLET ORAL
OUTPATIENT
Start: 2025-03-12

## 2025-02-12 RX ORDER — HEPARIN 100 UNIT/ML
500 SYRINGE INTRAVENOUS
OUTPATIENT
Start: 2025-03-12

## 2025-02-12 RX ORDER — SODIUM CHLORIDE 0.9 % (FLUSH) 0.9 %
10 SYRINGE (ML) INJECTION
OUTPATIENT
Start: 2025-03-12

## 2025-02-12 RX ORDER — IPRATROPIUM BROMIDE AND ALBUTEROL SULFATE 2.5; .5 MG/3ML; MG/3ML
3 SOLUTION RESPIRATORY (INHALATION)
Status: ACTIVE | OUTPATIENT
Start: 2025-02-12 | End: 2025-02-12

## 2025-02-12 RX ORDER — EPINEPHRINE 1 MG/ML
0.3 INJECTION, SOLUTION, CONCENTRATE INTRAVENOUS
OUTPATIENT
Start: 2025-03-12

## 2025-02-12 RX ADMIN — SODIUM CHLORIDE, PRESERVATIVE FREE 10 ML: 5 INJECTION INTRAVENOUS at 09:02

## 2025-02-12 RX ADMIN — VEDOLIZUMAB 300 MG: 300 INJECTION, POWDER, LYOPHILIZED, FOR SOLUTION INTRAVENOUS at 08:02

## 2025-02-12 NOTE — NURSING
Pt presented to Infusion Clinic for Week 6 Entyvio, no complaints, no diarrhea or constipation or bloody stools; pt tolerated infusion without incident & return in 8 wks to start mainenance dose.

## 2025-03-21 ENCOUNTER — RESULTS FOLLOW-UP (OUTPATIENT)
Dept: GASTROENTEROLOGY | Facility: HOSPITAL | Age: 40
End: 2025-03-21

## 2025-04-10 ENCOUNTER — INFUSION (OUTPATIENT)
Dept: INFUSION THERAPY | Facility: HOSPITAL | Age: 40
End: 2025-04-10
Payer: MEDICAID

## 2025-04-10 VITALS
BODY MASS INDEX: 36.08 KG/M2 | RESPIRATION RATE: 18 BRPM | SYSTOLIC BLOOD PRESSURE: 118 MMHG | DIASTOLIC BLOOD PRESSURE: 76 MMHG | OXYGEN SATURATION: 99 % | WEIGHT: 211.31 LBS | HEIGHT: 64 IN | HEART RATE: 71 BPM | TEMPERATURE: 99 F

## 2025-04-10 DIAGNOSIS — K51.50 LEFT SIDED ULCERATIVE COLITIS WITHOUT COMPLICATION: Primary | ICD-10-CM

## 2025-04-10 PROCEDURE — 63600175 PHARM REV CODE 636 W HCPCS: Mod: JZ,TB | Performed by: INTERNAL MEDICINE

## 2025-04-10 PROCEDURE — 25000003 PHARM REV CODE 250: Performed by: INTERNAL MEDICINE

## 2025-04-10 PROCEDURE — 96365 THER/PROPH/DIAG IV INF INIT: CPT

## 2025-04-10 RX ORDER — EPINEPHRINE 1 MG/ML
0.3 INJECTION, SOLUTION, CONCENTRATE INTRAVENOUS
OUTPATIENT
Start: 2025-05-08

## 2025-04-10 RX ORDER — SODIUM CHLORIDE 0.9 % (FLUSH) 0.9 %
10 SYRINGE (ML) INJECTION
Status: DISCONTINUED | OUTPATIENT
Start: 2025-04-10 | End: 2025-04-10 | Stop reason: HOSPADM

## 2025-04-10 RX ORDER — IPRATROPIUM BROMIDE AND ALBUTEROL SULFATE 2.5; .5 MG/3ML; MG/3ML
3 SOLUTION RESPIRATORY (INHALATION)
OUTPATIENT
Start: 2025-05-08

## 2025-04-10 RX ORDER — HEPARIN 100 UNIT/ML
500 SYRINGE INTRAVENOUS
OUTPATIENT
Start: 2025-05-08

## 2025-04-10 RX ORDER — SODIUM CHLORIDE 0.9 % (FLUSH) 0.9 %
10 SYRINGE (ML) INJECTION
OUTPATIENT
Start: 2025-05-08

## 2025-04-10 RX ORDER — METHYLPREDNISOLONE SOD SUCC 125 MG
40 VIAL (EA) INJECTION
OUTPATIENT
Start: 2025-05-08

## 2025-04-10 RX ORDER — HEPARIN 100 UNIT/ML
500 SYRINGE INTRAVENOUS
Status: DISCONTINUED | OUTPATIENT
Start: 2025-04-10 | End: 2025-04-10 | Stop reason: HOSPADM

## 2025-04-10 RX ORDER — ACETAMINOPHEN 325 MG/1
650 TABLET ORAL
OUTPATIENT
Start: 2025-05-08

## 2025-04-10 RX ADMIN — VEDOLIZUMAB 300 MG: 300 INJECTION, POWDER, LYOPHILIZED, FOR SOLUTION INTRAVENOUS at 12:04

## 2025-04-10 RX ADMIN — SODIUM CHLORIDE: 9 INJECTION, SOLUTION INTRAVENOUS at 12:04

## 2025-04-10 NOTE — NURSING
1215 Patient is here for Entyvio q 8 wks. She voices no c/o.     1340 Entyvio infusion given to R AC pic.

## 2025-06-05 ENCOUNTER — INFUSION (OUTPATIENT)
Dept: INFUSION THERAPY | Facility: HOSPITAL | Age: 40
End: 2025-06-05
Payer: MEDICAID

## 2025-06-05 VITALS
SYSTOLIC BLOOD PRESSURE: 123 MMHG | WEIGHT: 216.25 LBS | OXYGEN SATURATION: 99 % | TEMPERATURE: 98 F | DIASTOLIC BLOOD PRESSURE: 72 MMHG | BODY MASS INDEX: 36.92 KG/M2 | HEART RATE: 77 BPM | RESPIRATION RATE: 20 BRPM | HEIGHT: 64 IN

## 2025-06-05 DIAGNOSIS — K51.50 LEFT SIDED ULCERATIVE COLITIS WITHOUT COMPLICATION: Primary | ICD-10-CM

## 2025-06-05 PROCEDURE — 25000003 PHARM REV CODE 250: Performed by: INTERNAL MEDICINE

## 2025-06-05 PROCEDURE — 96365 THER/PROPH/DIAG IV INF INIT: CPT

## 2025-06-05 PROCEDURE — 63600175 PHARM REV CODE 636 W HCPCS: Mod: JZ,TB | Performed by: INTERNAL MEDICINE

## 2025-06-05 RX ORDER — EPINEPHRINE 1 MG/ML
0.3 INJECTION, SOLUTION, CONCENTRATE INTRAVENOUS
Status: ACTIVE | OUTPATIENT
Start: 2025-06-05 | End: 2025-06-05

## 2025-06-05 RX ORDER — SODIUM CHLORIDE 0.9 % (FLUSH) 0.9 %
10 SYRINGE (ML) INJECTION
Status: DISCONTINUED | OUTPATIENT
Start: 2025-06-05 | End: 2025-06-05 | Stop reason: HOSPADM

## 2025-06-05 RX ORDER — ACETAMINOPHEN 325 MG/1
650 TABLET ORAL
OUTPATIENT
Start: 2025-07-31

## 2025-06-05 RX ORDER — ACETAMINOPHEN 325 MG/1
650 TABLET ORAL
Status: ACTIVE | OUTPATIENT
Start: 2025-06-05 | End: 2025-06-05

## 2025-06-05 RX ORDER — SODIUM CHLORIDE 0.9 % (FLUSH) 0.9 %
10 SYRINGE (ML) INJECTION
OUTPATIENT
Start: 2025-07-31

## 2025-06-05 RX ORDER — IPRATROPIUM BROMIDE AND ALBUTEROL SULFATE 2.5; .5 MG/3ML; MG/3ML
3 SOLUTION RESPIRATORY (INHALATION)
OUTPATIENT
Start: 2025-07-31

## 2025-06-05 RX ORDER — METHYLPREDNISOLONE SOD SUCC 125 MG
40 VIAL (EA) INJECTION
Status: ACTIVE | OUTPATIENT
Start: 2025-06-05 | End: 2025-06-05

## 2025-06-05 RX ORDER — METHYLPREDNISOLONE SOD SUCC 125 MG
40 VIAL (EA) INJECTION
OUTPATIENT
Start: 2025-07-31

## 2025-06-05 RX ORDER — HEPARIN 100 UNIT/ML
500 SYRINGE INTRAVENOUS
Status: DISCONTINUED | OUTPATIENT
Start: 2025-06-05 | End: 2025-06-05 | Stop reason: HOSPADM

## 2025-06-05 RX ORDER — HEPARIN 100 UNIT/ML
500 SYRINGE INTRAVENOUS
OUTPATIENT
Start: 2025-07-31

## 2025-06-05 RX ORDER — IPRATROPIUM BROMIDE AND ALBUTEROL SULFATE 2.5; .5 MG/3ML; MG/3ML
3 SOLUTION RESPIRATORY (INHALATION)
Status: ACTIVE | OUTPATIENT
Start: 2025-06-05 | End: 2025-06-05

## 2025-06-05 RX ORDER — EPINEPHRINE 1 MG/ML
0.3 INJECTION, SOLUTION, CONCENTRATE INTRAVENOUS
OUTPATIENT
Start: 2025-07-31

## 2025-06-05 RX ADMIN — SODIUM CHLORIDE: 9 INJECTION, SOLUTION INTRAVENOUS at 02:06

## 2025-06-05 RX ADMIN — VEDOLIZUMAB 300 MG: 300 INJECTION, POWDER, LYOPHILIZED, FOR SOLUTION INTRAVENOUS at 02:06

## 2025-07-31 ENCOUNTER — INFUSION (OUTPATIENT)
Dept: INFUSION THERAPY | Facility: HOSPITAL | Age: 40
End: 2025-07-31
Payer: MEDICAID

## 2025-07-31 VITALS
HEIGHT: 64 IN | SYSTOLIC BLOOD PRESSURE: 134 MMHG | RESPIRATION RATE: 18 BRPM | DIASTOLIC BLOOD PRESSURE: 89 MMHG | TEMPERATURE: 98 F | WEIGHT: 214.5 LBS | BODY MASS INDEX: 36.62 KG/M2 | HEART RATE: 76 BPM | OXYGEN SATURATION: 100 %

## 2025-07-31 DIAGNOSIS — K51.50 LEFT SIDED ULCERATIVE COLITIS WITHOUT COMPLICATION: Primary | ICD-10-CM

## 2025-07-31 PROCEDURE — 96365 THER/PROPH/DIAG IV INF INIT: CPT

## 2025-07-31 PROCEDURE — 63600175 PHARM REV CODE 636 W HCPCS: Mod: JZ,TB | Performed by: INTERNAL MEDICINE

## 2025-07-31 PROCEDURE — 25000003 PHARM REV CODE 250: Performed by: INTERNAL MEDICINE

## 2025-07-31 RX ORDER — HEPARIN 100 UNIT/ML
500 SYRINGE INTRAVENOUS
Status: DISCONTINUED | OUTPATIENT
Start: 2025-07-31 | End: 2025-07-31 | Stop reason: HOSPADM

## 2025-07-31 RX ORDER — ACETAMINOPHEN 325 MG/1
650 TABLET ORAL
OUTPATIENT
Start: 2025-09-25

## 2025-07-31 RX ORDER — SODIUM CHLORIDE 0.9 % (FLUSH) 0.9 %
10 SYRINGE (ML) INJECTION
OUTPATIENT
Start: 2025-09-25

## 2025-07-31 RX ORDER — EPINEPHRINE 1 MG/ML
0.3 INJECTION, SOLUTION, CONCENTRATE INTRAVENOUS
OUTPATIENT
Start: 2025-09-25

## 2025-07-31 RX ORDER — METHYLPREDNISOLONE SOD SUCC 125 MG
40 VIAL (EA) INJECTION
OUTPATIENT
Start: 2025-09-25

## 2025-07-31 RX ORDER — HEPARIN 100 UNIT/ML
500 SYRINGE INTRAVENOUS
OUTPATIENT
Start: 2025-09-25

## 2025-07-31 RX ORDER — IPRATROPIUM BROMIDE AND ALBUTEROL SULFATE 2.5; .5 MG/3ML; MG/3ML
3 SOLUTION RESPIRATORY (INHALATION)
OUTPATIENT
Start: 2025-09-25

## 2025-07-31 RX ORDER — SODIUM CHLORIDE 0.9 % (FLUSH) 0.9 %
10 SYRINGE (ML) INJECTION
Status: DISCONTINUED | OUTPATIENT
Start: 2025-07-31 | End: 2025-07-31 | Stop reason: HOSPADM

## 2025-07-31 RX ADMIN — VEDOLIZUMAB 300 MG: 300 INJECTION, POWDER, LYOPHILIZED, FOR SOLUTION INTRAVENOUS at 01:07

## 2025-07-31 RX ADMIN — SODIUM CHLORIDE: 9 INJECTION, SOLUTION INTRAVENOUS at 01:07

## 2025-07-31 NOTE — NURSING
Pt ambulated with steady gait, alert & oriented x4.Denies complaints at this time. Infusion administered via peripheral iv. Tolerated tx well.

## (undated) DEVICE — KIT SURGICAL COLON .25 1.1OZ

## (undated) DEVICE — MANIFOLD 4 PORT

## (undated) DEVICE — FORCEP ALLIGATOR 2.8MM W/NDL